# Patient Record
Sex: MALE | Race: WHITE | NOT HISPANIC OR LATINO | Employment: FULL TIME | URBAN - METROPOLITAN AREA
[De-identification: names, ages, dates, MRNs, and addresses within clinical notes are randomized per-mention and may not be internally consistent; named-entity substitution may affect disease eponyms.]

---

## 2017-05-10 ENCOUNTER — ANESTHESIA EVENT (OUTPATIENT)
Dept: SURGERY | Facility: OTHER | Age: 57
End: 2017-05-10
Payer: COMMERCIAL

## 2017-05-10 ENCOUNTER — HOSPITAL ENCOUNTER (OUTPATIENT)
Dept: PREADMISSION TESTING | Facility: OTHER | Age: 57
Discharge: HOME | End: 2017-05-10
Attending: ORTHOPAEDIC SURGERY
Payer: COMMERCIAL

## 2017-05-10 VITALS
OXYGEN SATURATION: 96 % | DIASTOLIC BLOOD PRESSURE: 92 MMHG | WEIGHT: 230 LBS | HEIGHT: 73 IN | HEART RATE: 68 BPM | BODY MASS INDEX: 30.48 KG/M2 | TEMPERATURE: 99 F | SYSTOLIC BLOOD PRESSURE: 146 MMHG

## 2017-05-10 RX ORDER — FAMOTIDINE 20 MG/1
20 TABLET, FILM COATED ORAL
Status: CANCELLED | OUTPATIENT
Start: 2017-05-10 | End: 2017-05-10

## 2017-05-10 RX ORDER — SODIUM CHLORIDE, SODIUM LACTATE, POTASSIUM CHLORIDE, CALCIUM CHLORIDE 600; 310; 30; 20 MG/100ML; MG/100ML; MG/100ML; MG/100ML
INJECTION, SOLUTION INTRAVENOUS CONTINUOUS
Status: CANCELLED | OUTPATIENT
Start: 2017-05-10

## 2017-05-10 RX ORDER — MIDAZOLAM HYDROCHLORIDE 5 MG/ML
4 INJECTION INTRAMUSCULAR; INTRAVENOUS
Status: CANCELLED | OUTPATIENT
Start: 2017-05-10

## 2017-05-10 RX ORDER — LIDOCAINE HYDROCHLORIDE 10 MG/ML
1 INJECTION, SOLUTION EPIDURAL; INFILTRATION; INTRACAUDAL; PERINEURAL ONCE
Status: CANCELLED | OUTPATIENT
Start: 2017-05-10 | End: 2017-05-10

## 2017-05-10 RX ORDER — OXYCODONE HYDROCHLORIDE 5 MG/1
5 TABLET ORAL ONCE AS NEEDED
Status: CANCELLED | OUTPATIENT
Start: 2017-05-10 | End: 2017-05-10

## 2017-05-10 NOTE — ANESTHESIA PREPROCEDURE EVALUATION
05/10/2017  Elijah Zabala is a 56 y.o., male.    Anesthesia Evaluation    I have reviewed the Patient Summary Reports.    I have reviewed the Nursing Notes.   I have reviewed the Medications.     Review of Systems  Anesthesia Hx:  No problems with previous Anesthesia    Social:  Non-Smoker, No Alcohol Use    Hematology/Oncology:  Hematology Normal   Oncology Normal     EENT/Dental:EENT/Dental Normal   Cardiovascular:  Cardiovascular Normal Exercise tolerance: good     Pulmonary:  Pulmonary Normal    Renal/:  Renal/ Normal     Hepatic/GI:  Hepatic/GI Normal    Musculoskeletal:  Musculoskeletal Normal    Neurological:  Neurology Normal    Endocrine:  Endocrine Normal    Dermatological:  Skin Normal    Psych:  Psychiatric Normal           Physical Exam  General:  Well nourished    Airway/Jaw/Neck:  Airway Findings: Mouth Opening: Normal Tongue: Normal  General Airway Assessment: Adult, Good  Mallampati: I  TM Distance: Normal, at least 6 cm  Jaw/Neck Findings:      Dental:  Dental Findings: In tact, molar caps         Mental Status:  Mental Status Findings:  Cooperative, Alert and Oriented         Anesthesia Plan  Type of Anesthesia, risks & benefits discussed:  Anesthesia Type:  general  Patient's Preference:   Intra-op Monitoring Plan:   Intra-op Monitoring Plan Comments:   Post Op Pain Control Plan: single-shot nerve block  Post Op Pain Control Plan Comments:   Induction:    Beta Blocker:         Informed Consent: Patient understands risks and agrees with Anesthesia plan.  Questions answered.   ASA Score: 1     Day of Surgery Review of History & Physical:    H&P update referred to the surgeon.     Anesthesia Plan Notes: No labs.        Ready For Surgery From Anesthesia Perspective.

## 2017-05-10 NOTE — IP AVS SNAPSHOT
[facility] Location ([facility])  [address] [place], [place] [address]  Phone: [phone]           Patient Discharge Instructions  Our goal is to set you up for success. This packet includes information on your condition, medications, and your home care. It will help you care for yourself to prevent having to return to the hospital.     Please ask your nurse if you have any questions.      There are many details to remember when preparing for your surgery. Here is what you will need to do, please ask your nurse if there are more specific instructions and if you have any questions:    1. Before procedure Do not smoke or drink alcoholic beverages 24 hours prior to your procedure. Do not eat or drink anything 8 hours before your procedure - this includes gum, mints, and candy.     2. Day of procedure Please remove all jewelry for the procedure. If you wear contact lenses, dentures, hearing aids or glasses, bring a container to put them in during your surgery and give to a family member.  If your doctor has scheduled you for an overnight stay, bring a small overnight bag with any personal items that you need.      3. After procedure  Make arrangements in advance for transportation home by a responsible adult. It is not safe to drive a vehicle during the 24 hours following surgery.     PLEASE NOTE: You may be contacted the day before your surgery to confirm your surgery date and arrival time. The Surgery schedule has many variables which may affect the time of your surgery case. Family members should be available if your surgery time changes.               ** Verify the list of medication(s) below is accurate and up to date. Carry this with you in case of emergency. If your medications have changed, please notify your healthcare provider.             Medication List      Notice     You have not been prescribed any medications.               Please bring to all follow up appointments:    1. A copy of your  discharge instructions.  2. All medicines you are currently taking in their original bottles.  3. Identification and insurance card.    Please arrive 15 minutes ahead of scheduled appointment time.    Please call 24 hours in advance if you must reschedule your appointment and/or time.        Your Future Surgeries/Procedures     May 12, 2017   Surgery with Issa Garcia MD   [facility] ([facility])    [address] [place] [facility] [address]   [phone]                  Discharge Instructions       PRE-ADMIT TESTING -  [phone]    [address] [place] [address]        OUTPATIENT SURGERY UNIT - [phone]    Your surgery has been scheduled at [facility]. We are pleased to have the opportunity to serve you. For Further Information please call [phone].    On the day of surgery please report to the Information Desk on the 1st floor.    CONTACT YOUR PHYSICIAN'S OFFICE THE DAY PRIOR TO YOUR SURGERY TO OBTAIN YOUR ARRIVAL TIME.     The evening before surgery do not eat anything after 9 p.m. ( this includes hard candy, chewing gum and mints).  You may only have GATORADE, POWERADE AND WATER  from 9 p.m. until you leave your home.   DO NOT DRINK ANY LIQUIDS ON THE WAY TO THE HOSPITAL.      SPECIAL MEDICATION INSTRUCTIONS: TAKE medications checked off by the Anesthesiologist on your Medication List.    Angiogram Patients: Take medications as instructed by your physician, including aspirin.     Surgery Patients:    If you take ASPIRIN - Your PHYSICIAN/SURGEON will need to inform you IF/OR when you need to stop taking aspirin prior to your surgery.     Do Not take any medications containing IBUPROFEN.  Do Not Wear any make-up or dark nail polish   (especially eye make-up) to surgery. If you come to surgery with makeup on you will be required to remove the makeup or nail polish.    Do not shave your surgical area at least 5 days prior to your surgery.  The surgical prep will be performed at the hospital according to Infection Control regulations.    Leave all valuables at home.   Do Not wear any jewelry or watches, including any metal in body piercings.  Contact Lens must be removed before surgery. Either do not wear the contact lens or bring a case and solution for storage.  Please bring a container for eyeglasses or dentures as required.  Bring any paperwork your physician has provided, such as consent forms,  history and physicals, doctor's orders, etc.   Bring comfortable clothes that are loose fitting to wear upon discharge. Take into consideration the type of surgery being performed.  Maintain your diet as advised per your physician the day prior to surgery.      Adequate rest the night before surgery is advised.   Park in the Parking lot behind the hospital or in the [employer] [facility] across the street from the parking lot. Parking is complimentary.  If you will be discharged the same day as your procedure, please arrange for a responsible adult to drive you home or to accompany you if traveling by taxi.   YOU WILL NOT BE PERMITTED TO DRIVE OR TO LEAVE THE HOSPITAL ALONE AFTER SURGERY.   It is strongly recommended that you arrange for someone to remain with you for the first 24 hrs following your surgery.       Thank you for your cooperation.  The Staff of [facility].        Bathing Instructions                                                                 Please shower the evening before and morning of your procedure with    ANTIBACTERIAL SOAP. ( DIAL, etc )  Concentrate on the surgical area   for at least 3 minutes and rinse completely. Dry off as usual.   Do not use any deodorant, powder, body lotions, perfume, after shave or    cologne.                                                Admission Information     Date & Time Provider Department [facility]    5/10/2017  9:00 AM Issa Garcia MD [facility] [phone]  "     Care Providers     Provider Role Specialty Primary office phone    Issa Garcia MD Attending Provider Orthopedic Surgery [phone]      Your Vitals Were     BP Pulse Temp Height Weight SpO2    146/92 68 98.5 °F (36.9 °C) (Oral) 6' 1" (1.854 m) 104.3 kg (230 lb) 96%    BMI                30.34 kg/m2          Recent Lab Values     No lab values to display.      Allergies as of 5/10/2017     No Known Allergies      [facility]     [facility] Nurse Care Line - 24/7 Assistance  Unless otherwise directed by your provider, please contact [facility], our nurse care line that is available for 24/7 assistance.     Registered nurses in the [facility] provide clinical advisement, health education, appointment booking, and other advisory services.  Call for this free service at [phone].        Advance Directives     An advance directive is a document which, in the event you are no longer able to make decisions for yourself, tells your healthcare team what kind of treatment you do or do not want to receive, or who you would like to make those decisions for you.  If you do not currently have an advance directive, [facility] encourages you to create one.  For more information call:  [phone]), [phone] ([phone]),  or log on to [email or web address].        Language Assistance Services     ATTENTION: Language assistance services are available, free of charge. Please call [phone].      ATENCIÓN: Si habla español, tiene a nix disposición servicios gratuitos de asistencia lingüística. Llame al [phone].     [facility] Ý: N?u b?n nói Ti?ng Vi?t, có các d?ch v? h? tr? ngôn ng? mi?n phí dành cho b?n. G?i s? [phone].        [facility] Sign-Up     Activating your [facility] account is as easy as 1-2-3!     1) Visit [email or web address], select Sign Up Now, enter this activation code and your date of birth, then select Next.  [email or web address]  Expires: 6/24/2017  " 9:03 AM      2) Create a username and password to use when you visit [facility] in the future and select a security question in case you lose your password and select Next.    3) Enter your e-mail address and click Sign Up!    Additional Information  If you have questions, please e-mail [email or web address] or call [phone] to talk to our [facility] staff. Remember, [facility] is NOT to be used for urgent needs. For medical emergencies, dial 911.          [facility] complies with applicable Federal civil rights laws and does not discriminate on the basis of race, color, national origin, age, disability, or sex.

## 2017-05-10 NOTE — DISCHARGE INSTRUCTIONS
PRE-ADMIT TESTING -  [phone]    [address]  [facility]        OUTPATIENT SURGERY UNIT - [phone]    Your surgery has been scheduled at [facility]. We are pleased to have the opportunity to serve you. For Further Information please call [phone].    On the day of surgery please report to the Information Desk on the 1st floor.    CONTACT YOUR PHYSICIAN'S OFFICE THE DAY PRIOR TO YOUR SURGERY TO OBTAIN YOUR ARRIVAL TIME.     The evening before surgery do not eat anything after 9 p.m. ( this includes hard candy, chewing gum and mints).  You may only have GATORADE, POWERADE AND WATER  from 9 p.m. until you leave your home.   DO NOT DRINK ANY LIQUIDS ON THE WAY TO THE HOSPITAL.      SPECIAL MEDICATION INSTRUCTIONS: TAKE medications checked off by the Anesthesiologist on your Medication List.    Angiogram Patients: Take medications as instructed by your physician, including aspirin.     Surgery Patients:    If you take ASPIRIN - Your PHYSICIAN/SURGEON will need to inform you IF/OR when you need to stop taking aspirin prior to your surgery.     Do Not take any medications containing IBUPROFEN.  Do Not Wear any make-up or dark nail polish   (especially eye make-up) to surgery. If you come to surgery with makeup on you will be required to remove the makeup or nail polish.    Do not shave your surgical area at least 5 days prior to your surgery. The surgical prep will be performed at the hospital according to Infection Control regulations.    Leave all valuables at home.   Do Not wear any jewelry or watches, including any metal in body piercings.  Contact Lens must be removed before surgery. Either do not wear the contact lens or bring a case and solution for storage.  Please bring a container for eyeglasses or dentures as required.  Bring any paperwork your physician has provided, such as consent forms,  history and physicals, doctor's orders, etc.   Bring comfortable clothes that  are loose fitting to wear upon discharge. Take into consideration the type of surgery being performed.  Maintain your diet as advised per your physician the day prior to surgery.      Adequate rest the night before surgery is advised.   Park in the Parking lot behind the hospital or in the [employer] [facility] across the street from the parking lot. Parking is complimentary.  If you will be discharged the same day as your procedure, please arrange for a responsible adult to drive you home or to accompany you if traveling by taxi.   YOU WILL NOT BE PERMITTED TO DRIVE OR TO LEAVE THE HOSPITAL ALONE AFTER SURGERY.   It is strongly recommended that you arrange for someone to remain with you for the first 24 hrs following your surgery.       Thank you for your cooperation.  The Staff of [facility].        Bathing Instructions                                                                 Please shower the evening before and morning of your procedure with    ANTIBACTERIAL SOAP. ( DIAL, etc )  Concentrate on the surgical area   for at least 3 minutes and rinse completely. Dry off as usual.   Do not use any deodorant, powder, body lotions, perfume, after shave or    cologne.

## 2017-05-12 ENCOUNTER — HOSPITAL ENCOUNTER (OUTPATIENT)
Facility: OTHER | Age: 57
Discharge: HOME | End: 2017-05-12
Attending: ORTHOPAEDIC SURGERY | Admitting: ORTHOPAEDIC SURGERY
Payer: COMMERCIAL

## 2017-05-12 ENCOUNTER — ANESTHESIA (OUTPATIENT)
Dept: SURGERY | Facility: OTHER | Age: 57
End: 2017-05-12
Payer: COMMERCIAL

## 2017-05-12 VITALS
TEMPERATURE: 98 F | WEIGHT: 230 LBS | SYSTOLIC BLOOD PRESSURE: 122 MMHG | HEART RATE: 77 BPM | HEIGHT: 73 IN | DIASTOLIC BLOOD PRESSURE: 78 MMHG | RESPIRATION RATE: 16 BRPM | OXYGEN SATURATION: 96 % | BODY MASS INDEX: 30.48 KG/M2

## 2017-05-12 DIAGNOSIS — M67.814 BICEPS TENDINOSIS OF LEFT SHOULDER: Primary | ICD-10-CM

## 2017-05-12 DIAGNOSIS — M24.012 LOOSE BODY IN JOINT OF LEFT SHOULDER REGION: ICD-10-CM

## 2017-05-12 DIAGNOSIS — M75.22 BICEPS TENDINITIS OF LEFT SHOULDER: ICD-10-CM

## 2017-05-12 PROBLEM — S43.80XA DETACHMENT OF GLENOID LABRUM: Status: ACTIVE | Noted: 2017-05-12

## 2017-05-12 PROCEDURE — 71000039 HC RECOVERY, EACH ADD'L HOUR: Performed by: ORTHOPAEDIC SURGERY

## 2017-05-12 PROCEDURE — 36000711: Performed by: ORTHOPAEDIC SURGERY

## 2017-05-12 PROCEDURE — 25000003 PHARM REV CODE 250: Performed by: ORTHOPAEDIC SURGERY

## 2017-05-12 PROCEDURE — 37000008 HC ANESTHESIA 1ST 15 MINUTES: Performed by: ORTHOPAEDIC SURGERY

## 2017-05-12 PROCEDURE — 71000015 HC POSTOP RECOV 1ST HR: Performed by: ORTHOPAEDIC SURGERY

## 2017-05-12 PROCEDURE — 71000033 HC RECOVERY, INTIAL HOUR: Performed by: ORTHOPAEDIC SURGERY

## 2017-05-12 PROCEDURE — 71000016 HC POSTOP RECOV ADDL HR: Performed by: ORTHOPAEDIC SURGERY

## 2017-05-12 PROCEDURE — 63600175 PHARM REV CODE 636 W HCPCS: Performed by: NURSE ANESTHETIST, CERTIFIED REGISTERED

## 2017-05-12 PROCEDURE — 25000003 PHARM REV CODE 250: Performed by: NURSE ANESTHETIST, CERTIFIED REGISTERED

## 2017-05-12 PROCEDURE — 37000009 HC ANESTHESIA EA ADD 15 MINS: Performed by: ORTHOPAEDIC SURGERY

## 2017-05-12 PROCEDURE — 63600175 PHARM REV CODE 636 W HCPCS: Performed by: ANESTHESIOLOGY

## 2017-05-12 PROCEDURE — 27201423 OPTIME MED/SURG SUP & DEVICES STERILE SUPPLY: Performed by: ORTHOPAEDIC SURGERY

## 2017-05-12 PROCEDURE — 25000003 PHARM REV CODE 250: Performed by: SPECIALIST

## 2017-05-12 PROCEDURE — 63600175 PHARM REV CODE 636 W HCPCS: Performed by: ORTHOPAEDIC SURGERY

## 2017-05-12 PROCEDURE — 25000003 PHARM REV CODE 250: Performed by: ANESTHESIOLOGY

## 2017-05-12 PROCEDURE — 36000710: Performed by: ORTHOPAEDIC SURGERY

## 2017-05-12 PROCEDURE — C1713 ANCHOR/SCREW BN/BN,TIS/BN: HCPCS | Performed by: ORTHOPAEDIC SURGERY

## 2017-05-12 DEVICE — ANCHOR SUTURE FIBERTAK 1.5MM: Type: IMPLANTABLE DEVICE | Site: SHOULDER | Status: FUNCTIONAL

## 2017-05-12 RX ORDER — SODIUM CHLORIDE 9 MG/ML
INJECTION, SOLUTION INTRAVENOUS CONTINUOUS
Status: DISCONTINUED | OUTPATIENT
Start: 2017-05-12 | End: 2017-05-12 | Stop reason: HOSPADM

## 2017-05-12 RX ORDER — MIDAZOLAM HYDROCHLORIDE 1 MG/ML
INJECTION INTRAMUSCULAR; INTRAVENOUS
Status: DISCONTINUED | OUTPATIENT
Start: 2017-05-12 | End: 2017-05-12

## 2017-05-12 RX ORDER — LIDOCAINE HCL/PF 100 MG/5ML
SYRINGE (ML) INTRAVENOUS
Status: DISCONTINUED | OUTPATIENT
Start: 2017-05-12 | End: 2017-05-12

## 2017-05-12 RX ORDER — ROPIVACAINE HYDROCHLORIDE 5 MG/ML
INJECTION, SOLUTION EPIDURAL; INFILTRATION; PERINEURAL
Status: DISCONTINUED | OUTPATIENT
Start: 2017-05-12 | End: 2017-05-12

## 2017-05-12 RX ORDER — ONDANSETRON 4 MG/1
8 TABLET, ORALLY DISINTEGRATING ORAL EVERY 8 HOURS PRN
Qty: 10 TABLET | Refills: 1 | Status: SHIPPED | OUTPATIENT
Start: 2017-05-12

## 2017-05-12 RX ORDER — OXYCODONE AND ACETAMINOPHEN 10; 325 MG/1; MG/1
1 TABLET ORAL
Qty: 50 TABLET | Refills: 0 | Status: SHIPPED | OUTPATIENT
Start: 2017-05-12

## 2017-05-12 RX ORDER — OXYCODONE HYDROCHLORIDE 5 MG/1
5 TABLET ORAL
Status: DISCONTINUED | OUTPATIENT
Start: 2017-05-12 | End: 2017-05-12 | Stop reason: HOSPADM

## 2017-05-12 RX ORDER — ONDANSETRON 2 MG/ML
4 INJECTION INTRAMUSCULAR; INTRAVENOUS EVERY 12 HOURS PRN
Status: DISCONTINUED | OUTPATIENT
Start: 2017-05-12 | End: 2017-05-12 | Stop reason: HOSPADM

## 2017-05-12 RX ORDER — MEPERIDINE HYDROCHLORIDE 50 MG/ML
12.5 INJECTION INTRAMUSCULAR; INTRAVENOUS; SUBCUTANEOUS ONCE AS NEEDED
Status: DISCONTINUED | OUTPATIENT
Start: 2017-05-12 | End: 2017-05-12 | Stop reason: HOSPADM

## 2017-05-12 RX ORDER — PROPOFOL 10 MG/ML
VIAL (ML) INTRAVENOUS
Status: DISCONTINUED | OUTPATIENT
Start: 2017-05-12 | End: 2017-05-12

## 2017-05-12 RX ORDER — ROCURONIUM BROMIDE 10 MG/ML
INJECTION, SOLUTION INTRAVENOUS
Status: DISCONTINUED | OUTPATIENT
Start: 2017-05-12 | End: 2017-05-12

## 2017-05-12 RX ORDER — SODIUM CHLORIDE, SODIUM LACTATE, POTASSIUM CHLORIDE, CALCIUM CHLORIDE 600; 310; 30; 20 MG/100ML; MG/100ML; MG/100ML; MG/100ML
INJECTION, SOLUTION INTRAVENOUS CONTINUOUS
Status: DISCONTINUED | OUTPATIENT
Start: 2017-05-12 | End: 2017-05-12 | Stop reason: HOSPADM

## 2017-05-12 RX ORDER — MIDAZOLAM HYDROCHLORIDE 5 MG/ML
4 INJECTION INTRAMUSCULAR; INTRAVENOUS
Status: DISCONTINUED | OUTPATIENT
Start: 2017-05-12 | End: 2017-05-12 | Stop reason: HOSPADM

## 2017-05-12 RX ORDER — CLINDAMYCIN PHOSPHATE 900 MG/50ML
900 INJECTION, SOLUTION INTRAVENOUS
Status: COMPLETED | OUTPATIENT
Start: 2017-05-12 | End: 2017-05-12

## 2017-05-12 RX ORDER — HYDROMORPHONE HYDROCHLORIDE 2 MG/ML
0.4 INJECTION, SOLUTION INTRAMUSCULAR; INTRAVENOUS; SUBCUTANEOUS EVERY 5 MIN PRN
Status: DISCONTINUED | OUTPATIENT
Start: 2017-05-12 | End: 2017-05-12 | Stop reason: HOSPADM

## 2017-05-12 RX ORDER — KETOROLAC TROMETHAMINE 30 MG/ML
INJECTION, SOLUTION INTRAMUSCULAR; INTRAVENOUS
Status: DISCONTINUED | OUTPATIENT
Start: 2017-05-12 | End: 2017-05-12

## 2017-05-12 RX ORDER — DEXAMETHASONE SODIUM PHOSPHATE 4 MG/ML
INJECTION, SOLUTION INTRA-ARTICULAR; INTRALESIONAL; INTRAMUSCULAR; INTRAVENOUS; SOFT TISSUE
Status: DISCONTINUED | OUTPATIENT
Start: 2017-05-12 | End: 2017-05-12

## 2017-05-12 RX ORDER — ONDANSETRON 2 MG/ML
4 INJECTION INTRAMUSCULAR; INTRAVENOUS ONCE AS NEEDED
Status: DISCONTINUED | OUTPATIENT
Start: 2017-05-12 | End: 2017-05-12 | Stop reason: HOSPADM

## 2017-05-12 RX ORDER — ONDANSETRON HYDROCHLORIDE 2 MG/ML
INJECTION, SOLUTION INTRAMUSCULAR; INTRAVENOUS
Status: DISCONTINUED | OUTPATIENT
Start: 2017-05-12 | End: 2017-05-12

## 2017-05-12 RX ORDER — FAMOTIDINE 20 MG/1
20 TABLET, FILM COATED ORAL
Status: COMPLETED | OUTPATIENT
Start: 2017-05-12 | End: 2017-05-12

## 2017-05-12 RX ORDER — LIDOCAINE HYDROCHLORIDE 10 MG/ML
1 INJECTION, SOLUTION EPIDURAL; INFILTRATION; INTRACAUDAL; PERINEURAL ONCE
Status: DISCONTINUED | OUTPATIENT
Start: 2017-05-12 | End: 2017-05-12 | Stop reason: HOSPADM

## 2017-05-12 RX ORDER — SODIUM CHLORIDE 0.9 % (FLUSH) 0.9 %
3 SYRINGE (ML) INJECTION
Status: DISCONTINUED | OUTPATIENT
Start: 2017-05-12 | End: 2017-05-12 | Stop reason: HOSPADM

## 2017-05-12 RX ORDER — HYDROCODONE BITARTRATE AND ACETAMINOPHEN 5; 325 MG/1; MG/1
1 TABLET ORAL EVERY 4 HOURS PRN
Status: DISCONTINUED | OUTPATIENT
Start: 2017-05-12 | End: 2017-05-12 | Stop reason: HOSPADM

## 2017-05-12 RX ORDER — NEOSTIGMINE METHYLSULFATE 1 MG/ML
INJECTION, SOLUTION INTRAVENOUS
Status: DISCONTINUED | OUTPATIENT
Start: 2017-05-12 | End: 2017-05-12

## 2017-05-12 RX ORDER — GLYCOPYRROLATE 0.2 MG/ML
INJECTION INTRAMUSCULAR; INTRAVENOUS
Status: DISCONTINUED | OUTPATIENT
Start: 2017-05-12 | End: 2017-05-12

## 2017-05-12 RX ORDER — OXYCODONE HYDROCHLORIDE 5 MG/1
10 TABLET ORAL EVERY 4 HOURS PRN
Status: DISCONTINUED | OUTPATIENT
Start: 2017-05-12 | End: 2017-05-12 | Stop reason: HOSPADM

## 2017-05-12 RX ORDER — FENTANYL CITRATE 50 UG/ML
25 INJECTION, SOLUTION INTRAMUSCULAR; INTRAVENOUS EVERY 5 MIN PRN
Status: DISCONTINUED | OUTPATIENT
Start: 2017-05-12 | End: 2017-05-12 | Stop reason: HOSPADM

## 2017-05-12 RX ORDER — ACETAMINOPHEN 10 MG/ML
INJECTION, SOLUTION INTRAVENOUS
Status: DISCONTINUED | OUTPATIENT
Start: 2017-05-12 | End: 2017-05-12

## 2017-05-12 RX ORDER — OXYCODONE HYDROCHLORIDE 5 MG/1
5 TABLET ORAL ONCE AS NEEDED
Status: DISCONTINUED | OUTPATIENT
Start: 2017-05-12 | End: 2017-05-12 | Stop reason: HOSPADM

## 2017-05-12 RX ORDER — FENTANYL CITRATE 50 UG/ML
INJECTION, SOLUTION INTRAMUSCULAR; INTRAVENOUS
Status: DISCONTINUED | OUTPATIENT
Start: 2017-05-12 | End: 2017-05-12

## 2017-05-12 RX ORDER — EPINEPHRINE 1 MG/ML
INJECTION, SOLUTION INTRACARDIAC; INTRAMUSCULAR; INTRAVENOUS; SUBCUTANEOUS
Status: DISCONTINUED | OUTPATIENT
Start: 2017-05-12 | End: 2017-05-12 | Stop reason: HOSPADM

## 2017-05-12 RX ADMIN — FENTANYL CITRATE 100 MCG: 50 INJECTION, SOLUTION INTRAMUSCULAR; INTRAVENOUS at 07:05

## 2017-05-12 RX ADMIN — KETOROLAC TROMETHAMINE 30 MG: 30 INJECTION, SOLUTION INTRAMUSCULAR; INTRAVENOUS at 08:05

## 2017-05-12 RX ADMIN — ACETAMINOPHEN 1000 MG: 10 INJECTION, SOLUTION INTRAVENOUS at 07:05

## 2017-05-12 RX ADMIN — OXYCODONE HYDROCHLORIDE 5 MG: 5 TABLET ORAL at 09:05

## 2017-05-12 RX ADMIN — NEOSTIGMINE METHYLSULFATE 2.5 MG: 1 INJECTION INTRAVENOUS at 08:05

## 2017-05-12 RX ADMIN — ROCURONIUM BROMIDE 50 MG: 10 INJECTION, SOLUTION INTRAVENOUS at 07:05

## 2017-05-12 RX ADMIN — CLINDAMYCIN PHOSPHATE 900 MG: 18 INJECTION, SOLUTION INTRAVENOUS at 07:05

## 2017-05-12 RX ADMIN — PROPOFOL 100 MG: 10 INJECTION, EMULSION INTRAVENOUS at 07:05

## 2017-05-12 RX ADMIN — SODIUM CHLORIDE, SODIUM LACTATE, POTASSIUM CHLORIDE, AND CALCIUM CHLORIDE: 600; 310; 30; 20 INJECTION, SOLUTION INTRAVENOUS at 06:05

## 2017-05-12 RX ADMIN — FENTANYL CITRATE 100 MCG: 50 INJECTION, SOLUTION INTRAMUSCULAR; INTRAVENOUS at 06:05

## 2017-05-12 RX ADMIN — MIDAZOLAM HYDROCHLORIDE 2 MG: 1 INJECTION, SOLUTION INTRAMUSCULAR; INTRAVENOUS at 06:05

## 2017-05-12 RX ADMIN — ONDANSETRON 4 MG: 2 INJECTION, SOLUTION INTRAMUSCULAR; INTRAVENOUS at 08:05

## 2017-05-12 RX ADMIN — FAMOTIDINE 20 MG: 20 TABLET, FILM COATED ORAL at 05:05

## 2017-05-12 RX ADMIN — GLYCOPYRROLATE 0.4 MG: 0.2 INJECTION, SOLUTION INTRAMUSCULAR; INTRAVENOUS at 08:05

## 2017-05-12 RX ADMIN — SODIUM CHLORIDE, SODIUM LACTATE, POTASSIUM CHLORIDE, AND CALCIUM CHLORIDE: 600; 310; 30; 20 INJECTION, SOLUTION INTRAVENOUS at 08:05

## 2017-05-12 RX ADMIN — PROPOFOL 200 MG: 10 INJECTION, EMULSION INTRAVENOUS at 07:05

## 2017-05-12 RX ADMIN — ROPIVACAINE HYDROCHLORIDE 30 ML: 5 INJECTION, SOLUTION EPIDURAL; INFILTRATION; PERINEURAL at 06:05

## 2017-05-12 RX ADMIN — LIDOCAINE HYDROCHLORIDE 100 MG: 20 INJECTION, SOLUTION INTRAVENOUS at 07:05

## 2017-05-12 RX ADMIN — CARBOXYMETHYLCELLULOSE SODIUM 2 DROP: 2.5 SOLUTION/ DROPS OPHTHALMIC at 07:05

## 2017-05-12 RX ADMIN — DEXAMETHASONE SODIUM PHOSPHATE 8 MG: 4 INJECTION, SOLUTION INTRAMUSCULAR; INTRAVENOUS at 07:05

## 2017-05-12 NOTE — PLAN OF CARE
Patient prefers to have Dhaval Andino (friend) present for discharge teaching. Please contact them @[phone].

## 2017-05-12 NOTE — IP AVS SNAPSHOT
[facility] Location ([facility])  [address] [place] [id number]  Phone: [phone]           Patient Discharge Instructions   Our goal is to set you up for success. This packet includes information on your condition, medications, and your home care.  It will help you care for yourself to prevent having to return to the hospital.     Please ask your nurse if you have any questions.      There are many details to remember when preparing to leave the hospital. Here is what you will need to do:    1. Take your medicine. If you are prescribed medications, review your Medication List on the following pages. You may have new medications to  at the pharmacy and others that you'll need to stop taking. Review the instructions for how and when to take your medications. Talk with your doctor or nurses if you are unsure of what to do.     2. Go to your follow-up appointments. Specific follow-up information is listed in the following pages. Your may be contacted by a nurse or clinical provider about future appointments. Be sure we have all of the phone numbers to reach you. Please contact your provider's office if you are unable to make an appointment.     3. Watch for warning signs. Your doctor or nurse will give you detailed warning signs to watch for and when to call for assistance. These instructions may also include educational information about your condition. If you experience any of warning signs to your health, call your doctor.               ** Verify the list of medication(s) below is accurate and up to date. Carry this with you in case of emergency. If your medications have changed, please notify your healthcare provider.             Medication List      START taking these medications        Additional Info                      ondansetron 4 MG Jaida   Commonly known as:  ZOFRAN-ODT   Quantity:  10 tablet   Refills:  1   Dose:  8 mg    Instructions:  Take 2 tablets (8 mg total) by mouth every  8 (eight) hours as needed.     Begin Date    AM    Noon    PM    Bedtime       oxycodone-acetaminophen  mg per tablet   Commonly known as:  PERCOCET   Quantity:  50 tablet   Refills:  0   Dose:  1 tablet    Instructions:  Take 1 tablet by mouth every 4 to 6 hours as needed.     Begin Date    AM    Noon    PM    Bedtime            Where to Get Your Medications      These medications [facility] to [facility] [address] [address]     Phone:  [phone]     ondansetron 4 MG Tbdl         You can get these medications from any pharmacy     Bring a paper prescription for each of these medications     oxycodone-acetaminophen  mg per tablet                  Please bring to all follow up appointments:    1. A copy of your discharge instructions.  2. All medicines you are currently taking in their original bottles.  3. Identification and insurance card.    Please arrive 15 minutes ahead of scheduled appointment time.    Please call 24 hours in advance if you must reschedule your appointment and/or time.        Follow-up Information     Follow up with Issa Rosales MD. Schedule an appointment as soon as possible for a visit in 10 days.    Specialty:  Orthopedic Surgery    Why:  For suture removal, For wound re-check    Contact information:    [facility] [address] [phone]          Discharge Instructions     Future Orders    Call MD for:  difficulty breathing, headache or visual disturbances     Call MD for:  extreme fatigue     Call MD for:  hives     Call MD for:  persistent dizziness or light-headedness     Call MD for:  persistent nausea and vomiting     Call MD for:  redness, tenderness, or signs of infection (pain, swelling, redness, odor or green/yellow discharge around incision site)     Call MD for:  severe uncontrolled pain     Call MD for:  temperature >100.4     Diet general      Questions:    Total calories:      Fat restriction, if any:      Protein restriction, if any:      Na restriction, if any:      Fluid restriction:      Additional restrictions:      Lifting restrictions     Remove dressing in 72 hours     Comments:    Then change daily. Keep clean, dry and covered        Discharge Instructions          Shoulder Arthroscopy Post-Op Instructions                                       Dr. Issa Garcia    1. Sling/Brace- You should wear the brace until the first post-op visit. You can take the sling off to shower but keep your arm at your side.  Do not lift your arm away from your body unless told otherwise.  I will give you/your family specific instructions about the length of brace wear.    2. Bandage- If you have physical therapy set up they will remove the bandage. Otherwise you can remove it in 3 days. Keep the incisions clean, dry and covered. Do not get it wet until you see me.     3. Ice- Use the polar care as much as you want. Make sure there are clothes or a towel between the cooling unit and your skin.     4. Exercise- If you have PT set up, they will instruct you on exercises to do. If you do not, it is OK to lean your arm over and make circles with your hand pointing to the floor (called Pendulum exercises). Do not lift or grasp anything away from the body until you see me. It is OK to take your arm out of the sling and extend your elbow as long as your elbow is at your side.     5. Medications- You will be given pain and nausea prescriptions to go home. Take the medications as written.  Call the office if you are running low with at least 2-3 days notice to give us time to refill it.  We also recommend taking a baby aspirin for 2 weeks after surgery to decrease the (very,very low) risk of blood clot formation.    6. Bathing- Do not get your shoulder wet until you see me in clinic.    7. Appointment- You should already have your post-op appointment scheduled. If you do  not or you can't remember, call the office for more information.         Anesthesia: After Your Surgery    Youve just had surgery. During surgery, you received medication called anesthesia to keep you comfortable and pain-free. After surgery, you may experience some pain or nausea. This is common. Here are some tips for feeling better and recovering after surgery.    Going home  Your doctor or nurse will show you how to take care of yourself when you go home. He or she will also answer your questions. Have an adult family member or friend drive you home. For the first 24 hours after your surgery:  · Do not drive or use heavy equipment.  · Do not make important decisions or sign legal documents.  · Avoid alcohol.  · Have someone stay with you, if needed. He or she can watch for problems and help keep you safe.  Be sure to keep all follow-up appointments with your doctor. And rest after your procedure for as long as your doctor tells you to.    Coping with pain  If you have pain after surgery, pain medication will help you feel better. Take it as directed, before pain becomes severe. Also, ask your doctor or pharmacist about other ways to control pain, such as with heat, ice, and relaxation. And follow any other instructions your surgeon or nurse gives you.    Tips for taking pain medication  To get the best relief possible, remember these points:  · Pain medications can upset your stomach. Taking them with a little food may help.  · Most pain relievers taken by mouth need at least 20 to 30 minutes to take effect.  · Taking medication on a schedule can help you remember to take it. Try to time your medication so that you can take it before beginning an activity, such as dressing, walking, or sitting down for dinner.  · Constipation is a common side effect of pain medications. Contact your doctor before taking any medications like laxatives or stool softeners to help relieve constipation. Also ask about any dietary  restrictions, because drinking lots of fluids and eating foods like fruits and vegetables that are high in fiber can also help. Remember, dont take laxatives unless your surgeon has prescribed them.  · Mixing alcohol and pain medication can cause dizziness and slow your breathing. It can even be fatal. Dont drink alcohol while taking pain medication.  · Pain medication can slow your reflexes. Dont drive or operate machinery while taking pain medication.  If your health care provider tells you to take acetaminophen to help relieve your pain, ask him or her how much you are supposed to take each day. (Acetaminophen is the generic name for Tylenol and other brand-name pain relievers.) Acetaminophen or other pain relievers may interact with your prescription medicines or other over-the-counter (OTC) drugs. Some prescription medications contain acetaminophen along with other active ingredients. Using both prescription and OTC acetaminophen for pain can cause you to overdose. The FDA recommends that you read the labels on your OTC medications carefully. This will help you to clearly understand the list of active ingredients, dosing instructions, and any warnings. It may also help you avoid taking too much acetaminophen. If you have questions or don't understand the information, ask your pharmacist or health care provider to explain it to you before you take the OTC medication.    Managing nausea  Some people have an upset stomach after surgery. This is often due to anesthesia, pain, pain medications, or the stress of surgery. The following tips will help you manage nausea and get good nutrition as you recover. If you were on a special diet before surgery, ask your doctor if you should follow it during recovery. These tips may help:  · Dont push yourself to eat. Your body will tell you when to eat and how much.  · Start off with clear liquids and soup. They are easier to digest.  · Progress to semi-solid foods (mashed  "potatoes, applesauce, and gelatin) as you feel ready.  · Slowly move to solid foods. Dont eat fatty, rich, or spicy foods at first.  · Dont force yourself to have three large meals a day. Instead, eat smaller amounts more often.  · Take pain medications with a small amount of solid food, such as crackers or toast to avoid nausea.      Call your surgeon if  · You still have pain an hour after taking medication (it may not be strong enough).  · You feel too sleepy, dizzy, or groggy (medication may be too strong).  · You have side effects like nausea, vomiting, or skin changes (rash, itching, or hives).   © 8805-4441 [employer]. [address] [place] [address], [place] [address]. All rights reserved. This information is not intended as a substitute for professional medical care. Always follow your healthcare professional's instructions.                      Admission Information     Date & Time Provider Department CSN    5/12/2017  5:22 AM Issa Garcia MD [facility] [id number]      Care Providers     Provider Role Specialty Primary office phone    Issa Garcia MD Attending Provider Orthopedic Surgery [phone]    Issa Garcia MD Surgeon  Orthopedic Surgery [phone]      Your Vitals Were     BP Pulse Temp Resp Height Weight    137/94 (BP Location: Right arm, Patient Position: Lying, BP Method: Automatic) 76 97.6 °F (36.4 °C) (Oral) 16 6' 1" (1.854 m) 104.3 kg (230 lb)    SpO2 BMI             97% 30.34 kg/m2         Recent Lab Values     No lab values to display.      Allergies as of 5/12/2017     No Known Allergies      [facility]     [facility] Nurse Care Line - 24/7 Assistance  Unless otherwise directed by your provider, please contact [facility], our nurse care line that is available for 24/7 assistance.     Registered nurses in the [facility] provide clinical advisement, health education, appointment booking, and other advisory services.  Call " for this free service at [phone].        Advance Directives     An advance directive is a document which, in the event you are no longer able to make decisions for yourself, tells your healthcare team what kind of treatment you do or do not want to receive, or who you would like to make those decisions for you.  If you do not currently have an advance directive, [facility] encourages you to create one.  For more information call:  [phone]), [phone] ([phone]),  or log on to [email or web address].        Language Assistance Services     ATTENTION: Language assistance services are available, free of charge. Please call [phone].      ATENCIÓN: Si habla español, tiene a nix disposición servicios gratuitos de asistencia lingüística. Llame al [phone].     CHÚ Ý: N?u b?n nói Ti?ng Vi?t, có các d?ch v? h? tr? ngôn ng? mi?n phí dành cho b?n. G?i s? [phone].        [facility] Sign-Up     Activating your [facility] account is as easy as 1-2-3!     1) Visit [email or web address], select Sign Up Now, enter this activation code and your date of birth, then select Next.  [email or web address]  Expires: 6/24/2017  9:03 AM      2) Create a username and password to use when you visit [facility] in the future and select a security question in case you lose your password and select Next.    3) Enter your e-mail address and click Sign Up!    Additional Information  If you have questions, please e-mail [email or web address] or call [phone] to talk to our [facility] staff. Remember, [facility] is NOT to be used for urgent needs. For medical emergencies, dial 911.          [facility] complies with applicable Federal civil rights laws and does not discriminate on the basis of race, color, national origin, age, disability, or sex.

## 2017-05-12 NOTE — BRIEF OP NOTE
[facility]  Brief Operative Note     SUMMARY     Surgery Date: 5/12/2017     Surgeon(s) and Role:     * Issa Garcia MD - Primary    Assisting Surgeon: None    Pre-op Diagnosis:  Detachment of glenoid labrum, left, initial encounter [S43.432A]  Biceps tendinitis of left shoulder [M75.22]  Loose body in joint of left shoulder region [M24.012]    Post-op Diagnosis:  Post-Op Diagnosis Codes:     * Detachment of glenoid labrum, left, initial encounter [S43.432A]     * Biceps tendinitis of left shoulder [M75.22]     * Loose body in joint of left shoulder region [M24.012]    Procedure(s) (LRB):  ARTHROSCOPY-SHOULDER WITH REMOVAL LOOSE BODY  (Left)  ARTHROSCOPY-SHOULDER WITH SUBACROMIAL DECOMPRESSION (Left)  REPAIR-TENDON-BICEP- OPEN  (Left)  ARTHROSCOPY SHOULDER WITH SLAP REPAIR (Left)  ARTHROSCOPY-SHOULDER EXCISION DISTAL CLAVICLE (Left)    Anesthesia: General + Regional    Description of the findings of the procedure: ATS labral repair, SAD/DCE, LBR, open biceps tenodesis    Findings/Key Components: see above    Estimated Blood Loss: 15cc         Specimens:   Specimen     None          Discharge Note    SUMMARY     Admit Date: 5/12/2017    Discharge Date and Time:  05/12/2017 9:01 AM    Hospital Course (synopsis of major diagnoses, care, treatment, and services provided during the course of the hospital stay): outpt     Final Diagnosis: Post-Op Diagnosis Codes:     * Detachment of glenoid labrum, left, initial encounter [S43.432A]     * Biceps tendinitis of left shoulder [M75.22]     * Loose body in joint of left shoulder region [M24.012]    Disposition: Home or Self Care    Follow Up/Patient Instructions:     Medications:  Reconciled Home Medications:   Current Discharge Medication List      START taking these medications    Details   ondansetron (ZOFRAN-ODT) 4 MG TbDL Take 2 tablets (8 mg total) by mouth every 8 (eight) hours as needed.  Qty: 10 tablet, Refills: 1      oxycodone-acetaminophen  (PERCOCET)  mg per tablet Take 1 tablet by mouth every 4 to 6 hours as needed.  Qty: 50 tablet, Refills: 0             Discharge Procedure Orders  Diet general     Call MD for:  temperature >100.4     Call MD for:  persistent nausea and vomiting     Call MD for:  severe uncontrolled pain     Call MD for:  difficulty breathing, headache or visual disturbances     Call MD for:  redness, tenderness, or signs of infection (pain, swelling, redness, odor or green/yellow discharge around incision site)     Call MD for:  hives     Call MD for:  persistent dizziness or light-headedness     Call MD for:  extreme fatigue     Ice to affected area     Lifting restrictions     Remove dressing in 72 hours   Order Comments: Then change daily. Keep clean, dry and covered       Follow-up Information     Follow up with Issa Rosales MD. Schedule an appointment as soon as possible for a visit in 10 days.    Specialty:  Orthopedic Surgery    Why:  For suture removal, For wound re-check    Contact information:    [address] [place] [address] [phone]

## 2017-05-12 NOTE — ANESTHESIA PROCEDURE NOTES
Peripheral    Patient location during procedure: holding area   Block not for primary anesthetic.  Reason for block: at surgeon's request and post-op pain management   Post-op Pain Location: shoulder pain   End time: 5/12/2017 6:48 AM  Staffing  Anesthesiologist: NABIL HOOVER  Performed by: anesthesiologist   Preanesthetic Checklist  Completed: patient identified, site marked, surgical consent, pre-op evaluation, timeout performed, IV checked, risks and benefits discussed and monitors and equipment checked  Peripheral Block  Patient position: right lateral decubitus  Prep: ChloraPrep  Patient monitoring: heart rate, cardiac monitor, continuous pulse ox and frequent blood pressure checks  Block type: interscalene  Laterality: left  Injection technique: single shot  Needle  Needle type: Stimuplex   Needle gauge: 22 G  Needle length: 4 in  Needle localization: nerve stimulator and ultrasound guidance   -ultrasound image captured on disc.  Assessment  Injection assessment: negative aspiration, negative parasthesia and local visualized surrounding nerve  Paresthesia pain: none  Slow fractionated injection: yes  Medications:  Bolus administered: 30 mL of 0.5 ropivacaine  Epinephrine added: none

## 2017-05-12 NOTE — DISCHARGE INSTRUCTIONS
Shoulder Arthroscopy Post-Op Instructions                                       Dr. Issa Garcia    1. Sling/Brace- You should wear the brace until the first post-op visit. You can take the sling off to shower but keep your arm at your side.  Do not lift your arm away from your body unless told otherwise.  I will give you/your family specific instructions about the length of brace wear.    2. Bandage- If you have physical therapy set up they will remove the bandage. Otherwise you can remove it in 3 days. Keep the incisions clean, dry and covered. Do not get it wet until you see me.     3. Ice- Use the polar care as much as you want. Make sure there are clothes or a towel between the cooling unit and your skin.     4. Exercise- If you have PT set up, they will instruct you on exercises to do. If you do not, it is OK to lean your arm over and make circles with your hand pointing to the floor (called Pendulum exercises). Do not lift or grasp anything away from the body until you see me. It is OK to take your arm out of the sling and extend your elbow as long as your elbow is at your side.     5. Medications- You will be given pain and nausea prescriptions to go home. Take the medications as written.  Call the office if you are running low with at least 2-3 days notice to give us time to refill it.  We also recommend taking a baby aspirin for 2 weeks after surgery to decrease the (very,very low) risk of blood clot formation.    6. Bathing- Do not get your shoulder wet until you see me in clinic.    7. Appointment- You should already have your post-op appointment scheduled. If you do not or you can't remember, call the office for more information.         Anesthesia: After Your Surgery    Youve just had surgery. During surgery, you received medication called anesthesia to keep you comfortable and pain-free. After surgery, you may experience some pain or nausea. This is common. Here are some tips for feeling better  and recovering after surgery.    Going home  Your doctor or nurse will show you how to take care of yourself when you go home. He or she will also answer your questions. Have an adult family member or friend drive you home. For the first 24 hours after your surgery:  · Do not drive or use heavy equipment.  · Do not make important decisions or sign legal documents.  · Avoid alcohol.  · Have someone stay with you, if needed. He or she can watch for problems and help keep you safe.  Be sure to keep all follow-up appointments with your doctor. And rest after your procedure for as long as your doctor tells you to.    Coping with pain  If you have pain after surgery, pain medication will help you feel better. Take it as directed, before pain becomes severe. Also, ask your doctor or pharmacist about other ways to control pain, such as with heat, ice, and relaxation. And follow any other instructions your surgeon or nurse gives you.    Tips for taking pain medication  To get the best relief possible, remember these points:  · Pain medications can upset your stomach. Taking them with a little food may help.  · Most pain relievers taken by mouth need at least 20 to 30 minutes to take effect.  · Taking medication on a schedule can help you remember to take it. Try to time your medication so that you can take it before beginning an activity, such as dressing, walking, or sitting down for dinner.  · Constipation is a common side effect of pain medications. Contact your doctor before taking any medications like laxatives or stool softeners to help relieve constipation. Also ask about any dietary restrictions, because drinking lots of fluids and eating foods like fruits and vegetables that are high in fiber can also help. Remember, dont take laxatives unless your surgeon has prescribed them.  · Mixing alcohol and pain medication can cause dizziness and slow your breathing. It can even be fatal. Dont drink alcohol while taking  pain medication.  · Pain medication can slow your reflexes. Dont drive or operate machinery while taking pain medication.  If your health care provider tells you to take acetaminophen to help relieve your pain, ask him or her how much you are supposed to take each day. (Acetaminophen is the generic name for Tylenol and other brand-name pain relievers.) Acetaminophen or other pain relievers may interact with your prescription medicines or other over-the-counter (OTC) drugs. Some prescription medications contain acetaminophen along with other active ingredients. Using both prescription and OTC acetaminophen for pain can cause you to overdose. The FDA recommends that you read the labels on your OTC medications carefully. This will help you to clearly understand the list of active ingredients, dosing instructions, and any warnings. It may also help you avoid taking too much acetaminophen. If you have questions or don't understand the information, ask your pharmacist or health care provider to explain it to you before you take the OTC medication.    Managing nausea  Some people have an upset stomach after surgery. This is often due to anesthesia, pain, pain medications, or the stress of surgery. The following tips will help you manage nausea and get good nutrition as you recover. If you were on a special diet before surgery, ask your doctor if you should follow it during recovery. These tips may help:  · Dont push yourself to eat. Your body will tell you when to eat and how much.  · Start off with clear liquids and soup. They are easier to digest.  · Progress to semi-solid foods (mashed potatoes, applesauce, and gelatin) as you feel ready.  · Slowly move to solid foods. Dont eat fatty, rich, or spicy foods at first.  · Dont force yourself to have three large meals a day. Instead, eat smaller amounts more often.  · Take pain medications with a small amount of solid food, such as crackers or toast to avoid  nausea.      Call your surgeon if  · You still have pain an hour after taking medication (it may not be strong enough).  · You feel too sleepy, dizzy, or groggy (medication may be too strong).  · You have side effects like nausea, vomiting, or skin changes (rash, itching, or hives).   © 1886-8360 [employer]. [address] [place] [address], [place] [address]. All rights reserved. This information is not intended as a substitute for professional medical care. Always follow your healthcare professional's instructions.

## 2017-05-12 NOTE — TRANSFER OF CARE
"Anesthesia Transfer of Care Note    Patient: Elijah Zabala    Procedure(s) Performed: Procedure(s) (LRB):  ARTHROSCOPY-SHOULDER WITH REMOVAL LOOSE BODY  (Left)  ARTHROSCOPY-SHOULDER WITH SUBACROMIAL DECOMPRESSION (Left)  REPAIR-TENDON-BICEP- OPEN  (Left)  ARTHROSCOPY SHOULDER WITH SLAP REPAIR (Left)  ARTHROSCOPY-SHOULDER EXCISION DISTAL CLAVICLE (Left)    Patient location: PACU    Anesthesia Type: general    Transport from OR: Transported from OR on room air with adequate spontaneous ventilation    Post pain: adequate analgesia    Post assessment: no apparent anesthetic complications    Post vital signs: stable    Level of consciousness: awake    Nausea/Vomiting: no nausea/vomiting    Complications: none    Transfer of care protocol was followed      Last vitals:   Visit Vitals    /89 (BP Location: Left arm, Patient Position: Sitting, BP Method: Automatic)    Pulse 64    Temp (P) 36.6 °C (97.8 °F) (Oral)    Resp 16    Ht 6' 1" (1.854 m)    Wt 104.3 kg (230 lb)    SpO2 95%    BMI 30.34 kg/m2     "

## 2017-05-12 NOTE — ANESTHESIA POSTPROCEDURE EVALUATION
"Anesthesia Post Evaluation    Patient: Elijah Zabala    Procedure(s) Performed: Procedure(s) (LRB):  ARTHROSCOPY-SHOULDER WITH REMOVAL LOOSE BODY  (Left)  ARTHROSCOPY-SHOULDER WITH SUBACROMIAL DECOMPRESSION (Left)  REPAIR-TENDON-BICEP- OPEN  (Left)  ARTHROSCOPY SHOULDER WITH SLAP REPAIR (Left)  ARTHROSCOPY-SHOULDER EXCISION DISTAL CLAVICLE (Left)    Final Anesthesia Type: general  Patient location during evaluation: PACU  Patient participation: Yes- Able to Participate  Level of consciousness: awake and alert  Post-procedure vital signs: reviewed and stable  Pain management: adequate  Airway patency: patent  PONV status at discharge: No PONV  Anesthetic complications: no      Cardiovascular status: blood pressure returned to baseline  Respiratory status: unassisted and spontaneous ventilation  Hydration status: euvolemic  Follow-up not needed.        Visit Vitals    BP (!) 152/77    Pulse 71    Temp 36.6 °C (97.8 °F) (Oral)    Resp 18    Ht 6' 1" (1.854 m)    Wt 104.3 kg (230 lb)    SpO2 97%    BMI 30.34 kg/m2       Pain/Con Score: Pain Assessment Performed: Yes (5/12/2017  5:30 AM)  Presence of Pain: denies (5/12/2017  9:20 AM)  Pain Rating Prior to Med Admin: 0 (5/12/2017  9:33 AM)  Con Score: 9 (5/12/2017  9:20 AM)      "

## 2017-05-12 NOTE — PLAN OF CARE
Elijah Zabala has met all discharge criteria from Phase II. Vital Signs are stable, ambulating  without difficulty. Discharge instructions given, patient verbalized understanding. Discharged from facility via wheelchair in stable condition.

## 2017-05-15 NOTE — OP NOTE
DATE OF PROCEDURE:  05/12/2017    TIME OF SURGERY:  7 a.m.    PREOPERATIVE DIAGNOSES:  1.  Left shoulder glenohumeral joint chondromalacia.  2.  Left shoulder labral tear.  3.  Left shoulder loose body.  4.  Left shoulder impingement syndrome.  5.  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  6.  Left shoulder proximal biceps tendon tear.    POSTOPERATIVE DIAGNOSES:  1.  Left shoulder glenohumeral joint chondromalacia.  2.  Left shoulder labral tear.  3.  Left shoulder loose body.  4.  Left shoulder impingement syndrome.  5.  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  6.  Left shoulder proximal biceps tendon tear.    PROCEDURES PERFORMED:  1.  Left shoulder arthroscopic labral repair/capsulorrhaphy.  2.  Left shoulder arthroscopic subacromial decompression (acromioplasty, CA   ligament excision, bursectomy).  3.  Left shoulder arthroscopic distal clavicle excision (Eleanor procedure).  4.  Left shoulder arthroscopic loose body removal (approximately 7 loose bodies,   the largest being 2.0 x 1.5 cm).  5.  Left shoulder open subpectoral biceps tenodesis.    SURGEON:  Issa Garcia M.D.    ASSISTANT:  Natacha Chairez CST.    COMPLICATIONS:  None.    SPECIMENS:  Seven osseous loose bodies return to the patient.    ESTIMATED BLOOD LOSS:  None.    IMPLANTS USED:  Arthrex FiberTak all suture anchors x3 for labral repair and 7   mm PEEK tenodesis screw for biceps tenodesis.    ANESTHESIA:  General endotracheal anesthesia plus regional.    HISTORY:  Elijah Zabala is a very pleasant 56-year-old gentleman with   persistent left shoulder pain, which failed extensive nonoperative treatment.    Preoperative workup including x-rays and MRI showed the above pathology.  Given   failure of nonoperative treatment, all risks and benefits were discussed at   length and informed consent was obtained for the above-stated procedure.    PROCEDURE IN DETAIL:  Elijah Zabala was taken to the  Operating Room on   05/12/2017 for planned left shoulder arthroscopy with planned mini open biceps   tenodesis.  He was given 900 mg of Cleocin as well as a regional nerve block by   the Anesthesia Service.  All risks and benefits were discussed at length and   informed consent was obtained prior to the above-stated procedure.    Elijah Zabala was taken to the Operating Room on 05/12/2017 for planned left   shoulder arthroscopy.  He was given preoperative 900 mg of Cleocin and a   regional nerve block within 1 hour of the incision.  He was brought to the   Operating Room, placed in the supine position.  General endotracheal anesthesia   was administered.  Examination under anesthesia was performed, which revealed   full passive motion with no pathologic laxity.  He was then placed in the   lateral decubitus position with all bony prominences padded appropriately and an   axillary roll in a spider shoulder suspension device at about 10 pounds of   weight.  After all bony prominences were padded appropriately and the axillary   roll was placed, his left upper extremity was prepped and draped in the usual   sterile fashion.  A time-out procedure was performed identifying the left   shoulder as the surgical site.  A 30 mL of normal saline were then injected into   the glenohumeral joint after all the bony prominences were padded appropriately   and a standard posterior portal was established.  This was then followed by an   anterior portal established under direct visualization with spinal needle   localization.  Diagnostic arthroscopy was then proceeded.  The patient had   diffuse grade II/III chondromalacia involving the humeral head and glenoid with   degenerative labral tearing from the 3 o'clock position anteriorly down   inferiorly to the 6 o'clock position and then looping back posteriorly back to   the 10 o'clock position posterior/superior.  Shaver was brought in from the 10   o'clock position superiorly  over to the 3 o'clock position anteriorly.  There   was what appeared to be more traumatic labral tearing.  This was felt to be   unstable and the shaver was brought in and the edge of the glenoid was lightly   decorticated and then 3 Arthrex FiberTak anchors were placed at the 3 o'clock   position, 1 o'clock position and 11 o'clock positions.  They were passed with a   combination of a 90-degree suture lasso as well as a bird beak and sliding knots   were tied down.  The labrum was nicely reapproximated.  The biceps was then   tenotomized with the electrocautery device with plans to tenodesis later.  The   undersurface of the rotator cuff was completely unremarkable.  The subscapularis   was unremarkable.  The axillary pouch was unremarkable.    The scope was then placed in the subacromial space.  Thick and chronic appearing   bursal tissue was encountered and through a lateral portal, a thorough   bursectomy was performed.  A type 2 acromion was encountered with a   hypertrophied CA ligament, so the CA ligament was recessed with the   electrocautery device and bleeding was controlled and then the bur was brought   in to perform an acromioplasty to type 1 morphology.  There is also a markedly   hypertrophic degenerative AC joint and a distal clavicle excision was performed   removing just over 1 cm of the distal clavicle and a small portion of the medial   acromion.  Care was taken in attempt to protect the superior capsule and   ligaments.  After this was done, the shaver was brought back in and removed any   soft tissue or bony debris and a drain was placed to the posterior portal in the   subacromial space and we proceeded with the mini open portion of the case.    The arm was taken out of suspension and the axilla was identified and protected.    A marking pen was used to naila the pectoralis major tendon as it inserted in   the humerus and a longitudinal incision was made through skin and subcutaneous   tissue  in the axilla at the region of the pectoralis major insertion.  The plane   between the pectoralis major and the conjoint tendon was identified and the   fascia was incised and the long head of the biceps was easily identified and   delivered out of the wound.  It was markedly thickened consistent with   tendinosis with diffuse partial thickness tearing and a very markedly   hypertrophic surrounding synovium.  In addition, with digital palpation, I could   feel multiple osseous loose bodies, which were identified preoperatively on the   x-ray and MRI, and I was able to deliver them.  They were more in the   combination of behind the pack and also in the supra-pectoral region, but I can   get them with my finger and then I carefully delivered them with tendon sheath   and out of the wound.  I thoroughly irrigated the wound and brought in the C-arm   fluoroscopy to confirm there were no other osseous loose bodies, which did not   see any additional.  After this was done, the anterior humerus was slightly   decorticated and the proximal portion of the long head of the biceps tendon was   cut leaving approximately 4 cm proximal to the musculotendinous junction.  It   was then whipstitched with a FiberLoop suture and a guidepin was placed   bicortically followed by a 7-mm reamer to create a unicortical socket.  The   wound was irrigated and suction was used to remove any bony debris.  One of the   ends of the loop suture was then shuttled through the tenodesis  and the   tendon was then delivered down into the socket.  A hemostatic clamp was placed   right at the interface of the socket and tendon to prevent any additional tendon   from getting sucked down into the socket and over tensioning of the biceps.    The 7 mm PEEK screw was then advanced flushed to the cortical surface.  The   biceps tension was good.  The elbow was ranged through flexion and extension   without any over tensioning of the tendon.  Next, a  free needle was used to pass   sutures back down through the tendon and then tied down snugly for backup   fixation strength.  The wound was again copiously irrigated.  The fascia was   then closed with 2-0 Vicryl, subcutaneous tissue closed with 2-0 Vicryl and skin   was closed with a running subcuticular Monocryl stitch.  Dermabond was applied   followed by a soft dressing.  C-arm fluoroscopy was then brought in to confirm   no osseous loose bodies, which they were not.  A soft dressing was then applied   followed by a PolarCare unit and an abduction brace.  The patient was then   extubated in the Operating Room and taken to the PACU without complication.      JOSSE  dd: 05/14/2017 18:54:32 (CDT)  td: 05/14/2017 23:47:54 (CDT)  Doc ID   #[id number]  Job ID #[id number]    CC:

## 2018-02-27 ENCOUNTER — TELEPHONE (OUTPATIENT)
Dept: NEUROLOGY | Facility: HOSPITAL | Age: 58
End: 2018-02-27

## 2019-03-01 ENCOUNTER — OFFICE VISIT (OUTPATIENT)
Dept: URGENT CARE | Facility: CLINIC | Age: 59
End: 2019-03-01
Payer: COMMERCIAL

## 2019-03-01 VITALS
TEMPERATURE: 98 F | HEIGHT: 73 IN | DIASTOLIC BLOOD PRESSURE: 85 MMHG | HEART RATE: 88 BPM | BODY MASS INDEX: 29.69 KG/M2 | OXYGEN SATURATION: 99 % | RESPIRATION RATE: 18 BRPM | WEIGHT: 224 LBS | SYSTOLIC BLOOD PRESSURE: 142 MMHG

## 2019-03-01 DIAGNOSIS — K14.8 TONGUE LESION: Primary | ICD-10-CM

## 2019-03-01 PROCEDURE — 99203 PR OFFICE/OUTPT VISIT, NEW, LEVL III, 30-44 MIN: ICD-10-PCS | Mod: S$GLB,,, | Performed by: FAMILY MEDICINE

## 2019-03-01 PROCEDURE — 99000 PR SPECIMEN HANDLING,DR OFF->LAB: ICD-10-PCS | Mod: S$GLB,,, | Performed by: EMERGENCY MEDICINE

## 2019-03-01 PROCEDURE — 99000 SPECIMEN HANDLING OFFICE-LAB: CPT | Mod: S$GLB,,, | Performed by: EMERGENCY MEDICINE

## 2019-03-01 PROCEDURE — 3008F BODY MASS INDEX DOCD: CPT | Mod: CPTII,S$GLB,, | Performed by: FAMILY MEDICINE

## 2019-03-01 PROCEDURE — 36415 PR COLLECTION VENOUS BLOOD,VENIPUNCTURE: ICD-10-PCS | Mod: S$GLB,,, | Performed by: EMERGENCY MEDICINE

## 2019-03-01 PROCEDURE — 36415 COLL VENOUS BLD VENIPUNCTURE: CPT | Mod: S$GLB,,, | Performed by: EMERGENCY MEDICINE

## 2019-03-01 PROCEDURE — 99203 OFFICE O/P NEW LOW 30 MIN: CPT | Mod: S$GLB,,, | Performed by: FAMILY MEDICINE

## 2019-03-01 PROCEDURE — 3008F PR BODY MASS INDEX (BMI) DOCUMENTED: ICD-10-PCS | Mod: CPTII,S$GLB,, | Performed by: FAMILY MEDICINE

## 2019-03-01 NOTE — PROGRESS NOTES
"Subjective:       Patient ID: Elijah Zabala is a 58 y.o. male.    Vitals:  height is 6' 1" (1.854 m) and weight is 101.6 kg (224 lb). His tympanic temperature is 98.3 °F (36.8 °C). His blood pressure is 142/85 (abnormal) and his pulse is 88. His respiration is 18 and oxygen saturation is 99%.     Chief Complaint: Oral Pain    Pt states he is experiencing tongue sensitivity on and off for two months. Pt denies pain or injury. Pt denies any other symptoms. Pt concerned for HSV outbreak on his tongue would like testing. Pain stays for a few days then subsides.       Oral Pain    This is a new problem. The current episode started more than 1 month ago. The problem has been waxing and waning. Pertinent negatives include no fever. He has tried nothing for the symptoms.       Constitution: Negative for chills, fatigue and fever.   HENT: Negative for congestion and sore throat.         Tongue pain   Neck: Negative for painful lymph nodes.   Cardiovascular: Negative for chest pain and leg swelling.   Eyes: Negative for double vision and blurred vision.   Respiratory: Negative for cough and shortness of breath.    Gastrointestinal: Negative for nausea, vomiting and diarrhea.   Genitourinary: Negative for dysuria, frequency and urgency.   Musculoskeletal: Negative for joint pain, joint swelling, muscle cramps and muscle ache.   Skin: Negative for color change, pale and rash.   Allergic/Immunologic: Negative for seasonal allergies.   Neurological: Negative for dizziness, history of vertigo, light-headedness, passing out and headaches.   Hematologic/Lymphatic: Negative for swollen lymph nodes, easy bruising/bleeding and history of blood clots. Does not bruise/bleed easily.   Psychiatric/Behavioral: Negative for nervous/anxious, sleep disturbance and depression. The patient is not nervous/anxious.        Objective:      Physical Exam   Constitutional: He is oriented to person, place, and time. He appears " well-developed and well-nourished. He is cooperative.  Non-toxic appearance. He does not appear ill. No distress.   HENT:   Head: Normocephalic and atraumatic.   Right Ear: Hearing, tympanic membrane, external ear and ear canal normal.   Left Ear: Hearing, tympanic membrane, external ear and ear canal normal.   Nose: Nose normal. No mucosal edema, rhinorrhea or nasal deformity. No epistaxis. Right sinus exhibits no maxillary sinus tenderness and no frontal sinus tenderness. Left sinus exhibits no maxillary sinus tenderness and no frontal sinus tenderness.   Mouth/Throat: Uvula is midline, oropharynx is clear and moist and mucous membranes are normal. Oral lesions present. No trismus in the jaw. Normal dentition. No uvula swelling. No posterior oropharyngeal erythema.       Sublingual glands normal   Eyes: Conjunctivae and lids are normal. No scleral icterus.   Sclera clear bilat   Neck: Trachea normal, full passive range of motion without pain and phonation normal. Neck supple.   Cardiovascular: Normal rate, regular rhythm, normal heart sounds, intact distal pulses and normal pulses.   Pulmonary/Chest: Effort normal and breath sounds normal. No respiratory distress.   Abdominal: Soft. Normal appearance and bowel sounds are normal. He exhibits no distension. There is no tenderness.   Musculoskeletal: Normal range of motion. He exhibits no edema or deformity.   Neurological: He is alert and oriented to person, place, and time. He exhibits normal muscle tone. Coordination normal.   Skin: Skin is warm, dry and intact. He is not diaphoretic. No pallor.   Psychiatric: He has a normal mood and affect. His speech is normal and behavior is normal. Judgment and thought content normal. Cognition and memory are normal.   Nursing note and vitals reviewed.      Assessment:       1. 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   Advised orajel and gargling salt water    Patient Instructions   PLEASE READ YOUR DISCHARGE INSTRUCTIONS ENTIRELY AS IT CONTAINS IMPORTANT INFORMATION.    Tylenol and ibuprofen for pain    Gargle salt water    oragel on the area if needed     We will call with results of your test    Please return or see your primary care doctor if you develop new or worsening symptoms.     You must understand that you have received an Urgent Care treatment only and that you may be released before all of your medical problems are known or treated.    Canker Sore    A canker sore (also called an aphthous ulcer) is a painful sore on the lining of the mouth. It is most painful during the first few days, and it lasts about 7 to 14 days before going away.  Causes  Canker sores are not cold sores or fever blisters. They are not contagious, so they are not spread by contact. The exact cause of canker sores is not clear, but there are a number of things that can trigger them in different people.  · Mild injury, such as biting the inside of the mouth, lip, or cheek, or dental procedures  · Stress  · Poor diet, or lack of certain nutrients, including B vitamins and iron  · Foods that can irritate the mouth, including tomatoes, citrus fruits, and some nuts (foods that are acidic or contain bitter substances called tannins)  · Irritating chemicals, such as those in some toothpastes and mouthwashes  · Certain chronic illnesses  Symptoms  Canker sores are found on the lining of the mouth. They can be inside the cheeks or lips, on the roof of the mouth, at the base of the gums, on the tongue, or in the back of the throat. Canker sores typically have these characteristics:  · Small, flat (not raised) sores  · Can be white or yellowish bumps that are red around the edges or have a red halo  · Usually small in size, roundish, and in groups  · Accompanied by pain or burning  Canker sores do not leave a  scar. But they usually come back.  Home care  The goals of canker sore treatment are to decrease the pain, speed healing, and prevent recurrence. No single treatment works for everyone. Try a number of techniques to see what works best.  General care  · You may find that soft, easy-to-chew foods cause less pain. Use a straw to direct liquids away from the sore.  · Use a soft-bristle toothbrush, and brush your teeth gently.  · Avoid acidic, salty, or spicy foods.  · Avoid injuring the inside of your mouth, or scraping your existing canker sores, by avoiding crusty and crunchy foods like french bread and chips.  Medicines  You can try over-the-counter medicines that cover the sores and numb them. This protects the sores while they heal and helps reduce pain.  Homemade rinses and solutions  You can use these solutions as mouth rinses. Spit them out after using them. You can also dab them on the sores. You can repeat these treatments as often as needed.  · Rinse your mouth with saltwater.  · Mix equal amounts of hydrogen peroxide and water. You can use it as a mouthwash or dab it on spots with a cotton swab. You can also add sodium bicarbonate to this to make a paste, and then dab it on spots.  Follow-up care  Follow up with your healthcare provider, or as advised.  · If a culture was done, you will be notified if the treatment needs to be changed. You can call as directed for the results.  Call 911  Contact emergency services if any of these occur:  · Trouble breathing  · Inability to swallow  · Extreme drowsiness or trouble awakening  · Fainting or loss of consciousness  · Rapid heart rate  · Seizure  · Stiff neck  When to seek medical advice  Call your healthcare provider right away if any of these occur:  · You have a fever of 100.4°F (38°C) or higher.  · You are pregnant.  · You just had surgery or another medical procedure, or you were just discharged from the hospital.  · You are unable to eat or swallow due to  pain.  Date Last Reviewed: 7/30/2015  © 7195-0609 [employer]. [address] [place] [address], [place] [address]. All rights reserved. This information is not intended as a substitute for professional medical care. Always follow your healthcare professional's instructions.

## 2019-03-01 NOTE — PATIENT INSTRUCTIONS
PLEASE READ YOUR DISCHARGE INSTRUCTIONS ENTIRELY AS IT CONTAINS IMPORTANT INFORMATION.    Tylenol and ibuprofen for pain    Gargle salt water    oragel on the area if needed     We will call with results of your test    Please return or see your primary care doctor if you develop new or worsening symptoms.     You must understand that you have received an Urgent Care treatment only and that you may be released before all of your medical problems are known or treated.    Canker Sore    A canker sore (also called an aphthous ulcer) is a painful sore on the lining of the mouth. It is most painful during the first few days, and it lasts about 7 to 14 days before going away.  Causes  Canker sores are not cold sores or fever blisters. They are not contagious, so they are not spread by contact. The exact cause of canker sores is not clear, but there are a number of things that can trigger them in different people.  · Mild injury, such as biting the inside of the mouth, lip, or cheek, or dental procedures  · Stress  · Poor diet, or lack of certain nutrients, including B vitamins and iron  · Foods that can irritate the mouth, including tomatoes, citrus fruits, and some nuts (foods that are acidic or contain bitter substances called tannins)  · Irritating chemicals, such as those in some toothpastes and mouthwashes  · Certain chronic illnesses  Symptoms  Canker sores are found on the lining of the mouth. They can be inside the cheeks or lips, on the roof of the mouth, at the base of the gums, on the tongue, or in the back of the throat. Canker sores typically have these characteristics:  · Small, flat (not raised) sores  · Can be white or yellowish bumps that are red around the edges or have a red halo  · Usually small in size, roundish, and in groups  · Accompanied by pain or burning  Canker sores do not leave a scar. But they usually come back.  Home care  The goals of canker sore treatment are to decrease the pain, speed  healing, and prevent recurrence. No single treatment works for everyone. Try a number of techniques to see what works best.  General care  · You may find that soft, easy-to-chew foods cause less pain. Use a straw to direct liquids away from the sore.  · Use a soft-bristle toothbrush, and brush your teeth gently.  · Avoid acidic, salty, or spicy foods.  · Avoid injuring the inside of your mouth, or scraping your existing canker sores, by avoiding crusty and crunchy foods like french bread and chips.  Medicines  You can try over-the-counter medicines that cover the sores and numb them. This protects the sores while they heal and helps reduce pain.  Homemade rinses and solutions  You can use these solutions as mouth rinses. Spit them out after using them. You can also dab them on the sores. You can repeat these treatments as often as needed.  · Rinse your mouth with saltwater.  · Mix equal amounts of hydrogen peroxide and water. You can use it as a mouthwash or dab it on spots with a cotton swab. You can also add sodium bicarbonate to this to make a paste, and then dab it on spots.  Follow-up care  Follow up with your healthcare provider, or as advised.  · If a culture was done, you will be notified if the treatment needs to be changed. You can call as directed for the results.  Call 911  Contact emergency services if any of these occur:  · Trouble breathing  · Inability to swallow  · Extreme drowsiness or trouble awakening  · Fainting or loss of consciousness  · Rapid heart rate  · Seizure  · Stiff neck  When to seek medical advice  Call your healthcare provider right away if any of these occur:  · You have a fever of 100.4°F (38°C) or higher.  · You are pregnant.  · You just had surgery or another medical procedure, or you were just discharged from the hospital.  · You are unable to eat or swallow due to pain.  Date Last Reviewed: 7/30/2015  © 1049-2805 [employer]. [address] [place] [address], [place]  [address]. All rights reserved. This information is not intended as a substitute for professional medical care. Always follow your healthcare professional's instructions.

## 2019-03-05 LAB — HSV1+2 IGM SER IA-ACNC: <0.91 RATIO (ref 0–0.9)

## 2019-03-07 ENCOUNTER — TELEPHONE (OUTPATIENT)
Dept: URGENT CARE | Facility: CLINIC | Age: 59
End: 2019-03-07

## 2019-03-07 NOTE — TELEPHONE ENCOUNTER
Spoke with patient regarding negative results. Patient is feeling much better. ----- Message from Vamsi Hess NP sent at 3/7/2019 10:26 AM CST -----  Okay to call pt with result: herpes titer negative, follow up with pcp if still having symptoms

## (undated) DEVICE — DRAPE STERI INSTRUMENT 1018

## (undated) DEVICE — GLOVE BIOGEL SKINSENSE PI 8.0

## (undated) DEVICE — ALCOHOL 70% ISOP W/GREEN 16OZ

## (undated) DEVICE — UNDERGLOVES BIOGEL PI SIZE 8

## (undated) DEVICE — WAND COBLATION TURBOVAC 90

## (undated) DEVICE — APPLICATOR CHLORAPREP ORN 26ML

## (undated) DEVICE — SEE MEDLINE ITEM 153151

## (undated) DEVICE — Device

## (undated) DEVICE — PASSER SUTURE LASSO STRGHT 90

## (undated) DEVICE — SUT MCRYL PLUS 4-0 PS2 27IN

## (undated) DEVICE — SOL 9P NACL IRR PIC IL

## (undated) DEVICE — GLOVE BIOGEL SKINSENSE PI 7.5

## (undated) DEVICE — BLADE SHAVER 4.5 6/BX

## (undated) DEVICE — DRAPE INCISE IOBAN 2 23X17IN

## (undated) DEVICE — CLOSURE SKIN STERI STRIP 1/2X4

## (undated) DEVICE — PAD ABD 8X10 STERILE

## (undated) DEVICE — DRESSING TRANS 4X4 TEGADERM

## (undated) DEVICE — SEE MEDLINE ITEM 157150

## (undated) DEVICE — SET EXTENSION 30 IN W/LL ROLLE

## (undated) DEVICE — GLOVE BIOGEL SKINSENSE PI 7.0

## (undated) DEVICE — DRESSING XEROFORM 1X8IN

## (undated) DEVICE — ELECTRODE REM PLYHSV RETURN 9

## (undated) DEVICE — DRAPE XRAY EQUIPMENT UNIV

## (undated) DEVICE — DRESSING XEROFORM FOIL PK 1X8

## (undated) DEVICE — TUBE SET INFLOW/OUTFLOW

## (undated) DEVICE — UNDERGLOVES BIOGEL PI SZ 7 LF

## (undated) DEVICE — NDL MAYO CAT 1/2 CIR #5

## (undated) DEVICE — GAUZE SPONGE 4X4 12PLY

## (undated) DEVICE — KIT TRACTION SHLDR ST DISP LF

## (undated) DEVICE — NDL MAYO CAT 1/2 CIR #6

## (undated) DEVICE — DRESSING SPONGE 8PLY 4X4 STRL

## (undated) DEVICE — SUT VICRYL 2-0 36 CT-1

## (undated) DEVICE — SAGITTAL BLADE 24.46MM

## (undated) DEVICE — TOWEL BATH WHITE

## (undated) DEVICE — TAPE SURG MEDIPORE 6X72IN

## (undated) DEVICE — SUT PROLENE 3-0 FS-1 MONO18

## (undated) DEVICE — PAD SHOULDER CARE POLAR

## (undated) DEVICE — ADHESIVE DERMABOND ADVANCED

## (undated) DEVICE — CANNULA ARTHROSCOPIC

## (undated) DEVICE — BLADE GATOR 4.2

## (undated) DEVICE — SUPPORT SLING SHOT II MEDIUM

## (undated) DEVICE — PENCIL ELECTROSURG HOLST W/BLD

## (undated) DEVICE — SOL NACL IRR 3000ML

## (undated) DEVICE — KIT DISP FIBERTAK SUTURE ANCHO